# Patient Record
Sex: FEMALE | Race: WHITE
[De-identification: names, ages, dates, MRNs, and addresses within clinical notes are randomized per-mention and may not be internally consistent; named-entity substitution may affect disease eponyms.]

---

## 2017-09-27 ENCOUNTER — HOSPITAL ENCOUNTER (INPATIENT)
Dept: HOSPITAL 83 - ED | Age: 82
LOS: 3 days | Discharge: HOME | DRG: 191 | End: 2017-09-30
Attending: INTERNAL MEDICINE | Admitting: INTERNAL MEDICINE
Payer: MEDICARE

## 2017-09-27 VITALS — SYSTOLIC BLOOD PRESSURE: 134 MMHG | DIASTOLIC BLOOD PRESSURE: 54 MMHG

## 2017-09-27 VITALS — BODY MASS INDEX: 28.16 KG/M2 | WEIGHT: 153.02 LBS | HEIGHT: 62 IN

## 2017-09-27 VITALS — DIASTOLIC BLOOD PRESSURE: 89 MMHG | SYSTOLIC BLOOD PRESSURE: 125 MMHG

## 2017-09-27 VITALS — DIASTOLIC BLOOD PRESSURE: 65 MMHG

## 2017-09-27 DIAGNOSIS — Z79.899: ICD-10-CM

## 2017-09-27 DIAGNOSIS — K22.70: ICD-10-CM

## 2017-09-27 DIAGNOSIS — J20.9: ICD-10-CM

## 2017-09-27 DIAGNOSIS — J80: ICD-10-CM

## 2017-09-27 DIAGNOSIS — Z88.5: ICD-10-CM

## 2017-09-27 DIAGNOSIS — Z79.01: ICD-10-CM

## 2017-09-27 DIAGNOSIS — J45.20: ICD-10-CM

## 2017-09-27 DIAGNOSIS — I10: ICD-10-CM

## 2017-09-27 DIAGNOSIS — Z99.81: ICD-10-CM

## 2017-09-27 DIAGNOSIS — Z88.1: ICD-10-CM

## 2017-09-27 DIAGNOSIS — J44.0: ICD-10-CM

## 2017-09-27 DIAGNOSIS — Z98.51: ICD-10-CM

## 2017-09-27 DIAGNOSIS — Z88.6: ICD-10-CM

## 2017-09-27 DIAGNOSIS — J44.1: Primary | ICD-10-CM

## 2017-09-27 DIAGNOSIS — E03.9: ICD-10-CM

## 2017-09-27 DIAGNOSIS — Z88.8: ICD-10-CM

## 2017-09-27 DIAGNOSIS — I48.0: ICD-10-CM

## 2017-09-27 LAB
ALBUMIN SERPL-MCNC: 3.5 GM/DL (ref 3.1–4.5)
ALP SERPL-CCNC: 87 U/L (ref 45–117)
ALT SERPL W P-5'-P-CCNC: 18 U/L (ref 12–78)
APPEARANCE UR: (no result)
AST SERPL-CCNC: 13 IU/L (ref 3–35)
BACTERIA #/AREA URNS HPF: (no result) /[HPF]
BASOPHILS # BLD AUTO: 0 10*3/UL (ref 0–0.1)
BASOPHILS NFR BLD AUTO: 0.5 % (ref 0–1)
BILIRUB UR QL STRIP: NEGATIVE
BUN SERPL-MCNC: 15 MG/DL (ref 7–24)
CHLORIDE SERPL-SCNC: 108 MMOL/L (ref 98–107)
COLOR UR: YELLOW
CREAT SERPL-MCNC: 0.66 MG/DL (ref 0.55–1.02)
EOSINOPHIL # BLD AUTO: 0.2 10*3/UL (ref 0–0.4)
EOSINOPHIL # BLD AUTO: 2.1 % (ref 1–4)
EPI CELLS #/AREA URNS HPF: (no result) /[HPF]
ERYTHROCYTE [DISTWIDTH] IN BLOOD BY AUTOMATED COUNT: 12.1 % (ref 0–14.5)
GLUCOSE UR QL: NEGATIVE
HCT VFR BLD AUTO: 44.3 % (ref 37–47)
HGB BLD-MCNC: 14.6 G/DL (ref 12–16)
HGB UR QL STRIP: (no result)
KETONES UR QL STRIP: NEGATIVE
LEUKOCYTE ESTERASE UR QL STRIP: NEGATIVE
LYMPHOCYTES # BLD AUTO: 1.4 10*3/UL (ref 1.3–4.4)
LYMPHOCYTES NFR BLD AUTO: 16.3 % (ref 27–41)
MAGNESIUM SERPL-MCNC: 2.1 MG/DL (ref 1.5–2.1)
MCH RBC QN AUTO: 32.3 PG (ref 27–31)
MCHC RBC AUTO-ENTMCNC: 33 G/DL (ref 33–37)
MCV RBC AUTO: 98 FL (ref 81–99)
MONOCYTES # BLD AUTO: 0.7 10*3/UL (ref 0.1–1)
MONOCYTES NFR BLD MANUAL: 7.5 % (ref 3–9)
MUCOUS THREADS URNS QL MICRO: (no result)
NEUT #: 6.5 10*3/UL (ref 2.3–7.9)
NEUT %: 73.4 % (ref 47–73)
NITRITE UR QL STRIP: NEGATIVE
NRBC BLD QL AUTO: 0 % (ref 0–0)
PH UR STRIP: 5.5 [PH] (ref 5–9)
PLATELET # BLD AUTO: 173 10*3/UL (ref 130–400)
PMV BLD AUTO: 11.9 FL (ref 9.6–12.3)
POTASSIUM SERPL-SCNC: 4.5 MMOL/L (ref 3.5–5.1)
PROT SERPL-MCNC: 6.6 GM/DL (ref 6.4–8.2)
RBC # BLD AUTO: 4.52 10*6/UL (ref 4.1–5.1)
RBC #/AREA URNS HPF: (no result) RBC/HPF (ref 0–2)
SODIUM SERPL-SCNC: 142 MMOL/L (ref 136–145)
SP GR UR: 1.02 (ref 1–1.03)
TROPONIN I SERPL-MCNC: < 0.015 NG/ML (ref ?–0.04)
UROBILINOGEN UR STRIP-MCNC: 0.2 E.U./DL (ref 0.2–1)
WBC #/AREA URNS HPF: (no result) WBC/HPF (ref 0–5)
WBC NRBC COR # BLD AUTO: 8.8 10*3/UL (ref 4.8–10.8)

## 2017-09-28 VITALS — SYSTOLIC BLOOD PRESSURE: 144 MMHG | DIASTOLIC BLOOD PRESSURE: 61 MMHG

## 2017-09-28 VITALS — SYSTOLIC BLOOD PRESSURE: 122 MMHG | DIASTOLIC BLOOD PRESSURE: 64 MMHG

## 2017-09-28 VITALS — DIASTOLIC BLOOD PRESSURE: 46 MMHG | SYSTOLIC BLOOD PRESSURE: 129 MMHG

## 2017-09-28 VITALS — SYSTOLIC BLOOD PRESSURE: 120 MMHG | DIASTOLIC BLOOD PRESSURE: 56 MMHG

## 2017-09-28 VITALS — DIASTOLIC BLOOD PRESSURE: 56 MMHG | SYSTOLIC BLOOD PRESSURE: 121 MMHG

## 2017-09-28 LAB
T4 FREE SERPL-MCNC: 1.34 NG/DL (ref 0.76–1.46)
TSH SERPL DL<=0.005 MIU/L-ACNC: 0.09 UIU/ML (ref 0.36–4.75)

## 2017-09-29 VITALS — SYSTOLIC BLOOD PRESSURE: 138 MMHG | DIASTOLIC BLOOD PRESSURE: 64 MMHG

## 2017-09-29 VITALS — DIASTOLIC BLOOD PRESSURE: 46 MMHG

## 2017-09-29 VITALS — DIASTOLIC BLOOD PRESSURE: 72 MMHG | SYSTOLIC BLOOD PRESSURE: 128 MMHG

## 2017-09-29 VITALS — DIASTOLIC BLOOD PRESSURE: 56 MMHG

## 2017-09-29 VITALS — DIASTOLIC BLOOD PRESSURE: 70 MMHG | SYSTOLIC BLOOD PRESSURE: 120 MMHG

## 2017-09-29 LAB
BUN SERPL-MCNC: 15 MG/DL (ref 7–24)
CHLORIDE SERPL-SCNC: 101 MMOL/L (ref 98–107)
CREAT SERPL-MCNC: 0.66 MG/DL (ref 0.55–1.02)
MAGNESIUM SERPL-MCNC: 2.2 MG/DL (ref 1.5–2.1)
POTASSIUM SERPL-SCNC: 4.7 MMOL/L (ref 3.5–5.1)
SODIUM SERPL-SCNC: 137 MMOL/L (ref 136–145)

## 2017-09-30 VITALS — SYSTOLIC BLOOD PRESSURE: 145 MMHG | DIASTOLIC BLOOD PRESSURE: 68 MMHG

## 2017-09-30 VITALS — DIASTOLIC BLOOD PRESSURE: 70 MMHG

## 2017-09-30 VITALS — DIASTOLIC BLOOD PRESSURE: 54 MMHG

## 2019-06-04 ENCOUNTER — HOSPITAL ENCOUNTER (INPATIENT)
Dept: HOSPITAL 83 - 5E | Age: 84
LOS: 6 days | Discharge: OTHER INSTITUTION | DRG: 190 | End: 2019-06-10
Attending: INTERNAL MEDICINE | Admitting: INTERNAL MEDICINE
Payer: MEDICARE

## 2019-06-04 VITALS — WEIGHT: 151.31 LBS | BODY MASS INDEX: 27.84 KG/M2 | HEIGHT: 62 IN

## 2019-06-04 VITALS — DIASTOLIC BLOOD PRESSURE: 80 MMHG | SYSTOLIC BLOOD PRESSURE: 138 MMHG

## 2019-06-04 VITALS — DIASTOLIC BLOOD PRESSURE: 46 MMHG | SYSTOLIC BLOOD PRESSURE: 109 MMHG

## 2019-06-04 VITALS — DIASTOLIC BLOOD PRESSURE: 76 MMHG

## 2019-06-04 VITALS — DIASTOLIC BLOOD PRESSURE: 78 MMHG

## 2019-06-04 DIAGNOSIS — K22.70: ICD-10-CM

## 2019-06-04 DIAGNOSIS — G93.41: ICD-10-CM

## 2019-06-04 DIAGNOSIS — J45.31: ICD-10-CM

## 2019-06-04 DIAGNOSIS — G47.33: ICD-10-CM

## 2019-06-04 DIAGNOSIS — T38.0X5A: ICD-10-CM

## 2019-06-04 DIAGNOSIS — Z90.49: ICD-10-CM

## 2019-06-04 DIAGNOSIS — R73.9: ICD-10-CM

## 2019-06-04 DIAGNOSIS — J44.0: Primary | ICD-10-CM

## 2019-06-04 DIAGNOSIS — J20.9: ICD-10-CM

## 2019-06-04 DIAGNOSIS — Z98.891: ICD-10-CM
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
## 2019-06-04 DIAGNOSIS — Z79.01: ICD-10-CM

## 2019-06-04 DIAGNOSIS — Z88.1: ICD-10-CM

## 2019-06-04 DIAGNOSIS — E66.9: ICD-10-CM

## 2019-06-04 DIAGNOSIS — I10: ICD-10-CM

## 2019-06-04 DIAGNOSIS — I48.2: ICD-10-CM

## 2019-06-04 DIAGNOSIS — Z88.8: ICD-10-CM

## 2019-06-04 DIAGNOSIS — R62.7: ICD-10-CM

## 2019-06-04 DIAGNOSIS — E78.2: ICD-10-CM

## 2019-06-04 DIAGNOSIS — Z88.5: ICD-10-CM

## 2019-06-04 DIAGNOSIS — E03.9: ICD-10-CM

## 2019-06-04 DIAGNOSIS — Z98.51: ICD-10-CM

## 2019-06-04 DIAGNOSIS — J44.1: ICD-10-CM

## 2019-06-04 DIAGNOSIS — M79.89: ICD-10-CM

## 2019-06-04 LAB
ALBUMIN SERPL-MCNC: 3 GM/DL (ref 3.1–4.5)
ALP SERPL-CCNC: 79 U/L (ref 45–117)
ALT SERPL W P-5'-P-CCNC: 25 U/L (ref 12–78)
AST SERPL-CCNC: 13 IU/L (ref 3–35)
BASOPHILS # BLD AUTO: 0 10*3/UL (ref 0–0.1)
BASOPHILS NFR BLD AUTO: 0.4 % (ref 0–1)
BUN SERPL-MCNC: 16 MG/DL (ref 7–24)
CHLORIDE SERPL-SCNC: 103 MMOL/L (ref 98–107)
CREAT SERPL-MCNC: 0.71 MG/DL (ref 0.55–1.02)
EOSINOPHIL # BLD AUTO: 0.2 10*3/UL (ref 0–0.4)
EOSINOPHIL # BLD AUTO: 1.7 % (ref 1–4)
ERYTHROCYTE [DISTWIDTH] IN BLOOD BY AUTOMATED COUNT: 12.4 % (ref 0–14.5)
HCT VFR BLD AUTO: 44.7 % (ref 37–47)
HGB BLD-MCNC: 14.6 G/DL (ref 12–16)
LYMPHOCYTES # BLD AUTO: 0.8 10*3/UL (ref 1.3–4.4)
LYMPHOCYTES NFR BLD AUTO: 8.6 % (ref 27–41)
MCH RBC QN AUTO: 33 PG (ref 27–31)
MCHC RBC AUTO-ENTMCNC: 32.7 G/DL (ref 33–37)
MCV RBC AUTO: 101.1 FL (ref 81–99)
MONOCYTES # BLD AUTO: 0.7 10*3/UL (ref 0.1–1)
MONOCYTES NFR BLD MANUAL: 7.8 % (ref 3–9)
NEUT #: 7.6 10*3/UL (ref 2.3–7.9)
NEUT %: 81 % (ref 47–73)
NRBC BLD QL AUTO: 0 % (ref 0–0)
PLATELET # BLD AUTO: 163 10*3/UL (ref 130–400)
PMV BLD AUTO: 11 FL (ref 9.6–12.3)
POTASSIUM SERPL-SCNC: 4.1 MMOL/L (ref 3.5–5.1)
PROT SERPL-MCNC: 6.5 GM/DL (ref 6.4–8.2)
RBC # BLD AUTO: 4.42 10*6/UL (ref 4.1–5.1)
SODIUM SERPL-SCNC: 142 MMOL/L (ref 136–145)
TROPONIN I SERPL-MCNC: < 0.015 NG/ML (ref ?–0.04)
WBC NRBC COR # BLD AUTO: 9.4 10*3/UL (ref 4.8–10.8)

## 2019-06-04 NOTE — PR
[place], [place]
 
                                      PROGRESS NOTE
 
        NAME: GERHARD BROUSSARD                  ACCT #: [id number]  
        UNIT #: [id number]                       ROOM: 531       
        DOCTOR: SHIRA BREAUX MD               BIRTHDATE: [birth year]
 
 
DOS: 06/09/2019
 
SUBJECTIVE:  The patient is about the same, does not have any new complaints.
 
OBJECTIVE:
VITAL SIGNS:  Graphic trend shows a pressure 110/62, pulse of 92, respirations
18, temperature 97.8.
LUNGS:  Diminished breath sounds.  Clear this morning.
HEART:  Irregular.  Heart rate controlled.
ABDOMEN:  Obese.
EXTREMITIES:  About 1+ pitting edema.
 
ASSESSMENT AND PLAN:
1.  Acute exacerbation of chronic obstructive pulmonary disease, on maximal
treatment plan.  Bronchospasm was resolving.  We will taper the steroids down.
2.  Chronic atrial fibrillation with rapid ventricular response.  Heart rate has
slowed down.
3.  Adult failure to thrive, awaiting placement to [facility]
tomorrow.
 
 
 
_________________________________
SHIRA BREAUX MD
 
CM:PNTRANS
 
D: 06/09/19 0804                 
T: 06/09/19 1116
             
                                                            
SHIRA BREAUX MD               
                                                            
06/09/19
1117
                              
interface

## 2019-06-04 NOTE — PR
[place], [place]
 
                                      PROGRESS NOTE
 
        NAME: GERHARD BROUSSARD                  ACCT #: [id number]  
        UNIT #: [id number]                       ROOM: 531       
        DOCTOR: SHIRA BREAUX MD               BIRTHDATE: [birth year]
 
 
DOS: 
 
SUBJECTIVE:  The patient is about the same, does not have any new complaints. 
She feels better.
 
OBJECTIVE:
VITAL SIGNS:  Graphic trend shows blood pressure of 151/61, pulse of 110,
respirations 27, temperature 98.1
LUNGS:  Diminished breath sounds, scattered wheezes, but improved since
admission.
HEART:  Irregular.
ABDOMEN:  Obese.
EXTREMITIES:  Without any edema.
 
ASSESSMENT AND PLAN:
1.  Acute exacerbation of chronic obstructive pulmonary disease, on maximal
treatment plan.
2.  Acute tracheobronchitis, on antibiotics.
3.  Adult failure to thrive.  She has agreed to go to rehab.  PT has been
consulted.  Case management looking at local rehab centers for the patient to
go.
 
 
 
_________________________________
SHIRA BREAUX MD
 
CM:PNTRANS
 
D: 06/06/19 0841                 
T: 06/06/19 1806
             
                                                            
SHIRA BREAUX MD               
                                                            
06/07/19
0247
                              
interface

## 2019-06-04 NOTE — PR
[place], [place]
 
                                      PROGRESS NOTE
 
        NAME: GERHARD BROUSSARD                  ACCT #: [id number]  
        UNIT #: [id number]                       ROOM: 531       
        DOCTOR: ZEINAB MELO MD           BIRTHDATE: [birth year]
 
 
DOS: 06/10/2019
 
PULMONARY PROGRESS NOTE
 
SUBJECTIVE:  The patient noted comfortable at this time, resting on the bed this
morning, continues to show improvement and resolution of acute symptoms this
morning.  Denies symptoms of fever or chills.  Coughing has been subsiding, but
not completely resolved.
 
OBJECTIVE:
VITAL SIGNS:  Normal temperature, respiratory rate 20, heart rate 101, blood
pressure 100/30.  The pulse oxygen saturation on 2 liters nasal cannula 92%
saturation recorded.
HEENT:  Examination shows head was atraumatic.  Eyes nonicterus.
NECK:  Supple.
CARDIOVASCULAR:  S1, S2 is audible.
LUNGS:  The patient was noted without any wheezing or crackles at the present
time.  Breaths are noted mild-to-moderate decreased bilaterally with occasional
wheezing, no crackles.
ABDOMEN:  Soft, nontender.  Bowel sounds present.
EXTREMITIES:  No acute change.
 
LABORATORY DATA:  CBC:  Normal WBC count and platelet count.  Blood sugar was
487.  BUN 26, creatinine 1.10.
 
IMPRESSION:
1.  Resolving acute exacerbation of chronic obstructive pulmonary disease, acute
bronchitis, gradually and progressive on current medical management.
2.  Uncontrolled hyperglycemia as well.
 
PLAN OF TREATMENT:  Change the Solu-Medrol 20 mg daily this morning and to be
discontinued after 3 days.  Continue antibiotics.  Discharge planning was
discussed with Dr. Chel Mireles.
 
 
 
 
                              [place], [place]
 
                                      PROGRESS NOTE
 
        NAME: GERHARD BROUSSARD                  ACCT #: [id number]  
        UNIT #: [id number]                       ROOM: 531       
        DOCTOR: ZEINAB MELO MD           BIRTHDATE: [birth year]
 
 
_________________________________
ZEINAB SWARTZ MD
 
CM:PNTRANS
 
D: 06/10/19 1006                 
T: 06/10/19 1349
             
                                                            
ZEINAB STEIN MD           
                                                            
06/10/19
1350
                              
interface

## 2019-06-04 NOTE — EKG
[place], [place]
 
                               ELECTROCARDIOGRAM REPORT
 
        NAME: GERHARD BROUSSARD                   ACCT #: [id number]  
        UNIT #: [id number]                        ROOM: 531       
        DOCTOR: YOUSUF DRAFT REPORT          BIRTHDATE: [birth year]
 
 
 

 
 
                           [facility]
                                       
Test Date:    2019               Test Time:    14:05:38
Pat Name:     GERHARD BROUSSARD             Department:   
Patient ID:   [id number]             Room:         531 1
Gender:       F                        Technician:   Mary Moreno
:          1931               Requested By: ZEINAB STEIN
Order Number: [id number]      Reading MD:   Zeinab Sheldon MD
                                 Measurements
Intervals                              Axis          
Rate:         105                      P:            
WA:                                    QRS:          43
QRSD:         81                       T:            79
QT:           325                                    
QTc:          430                                    
                           Interpretive Statements
Atrial fibrillation
Borderline T wave abnormalities
No previous ECG available for comparison
 
Electronically Signed On 2019 11:57:10 PDT by Zeinab Sheldon MD
 
CM:EKGRPT:ELECTROCARDIOGRAM REPORT
 
D: 19 1405
T: 19 1157
    
ZEINAB STEIN MD                                         
EPIPHANY DRAFT REPORT         
ZEINAB STEIN MD

## 2019-06-04 NOTE — PR
[place], [place]
 
                                      PROGRESS NOTE
 
        NAME: GERHARD BROUSSARD                  [id number]  
        UNIT #: [id number]                       ROOM: 531       
        DOCTOR: SHIRA BREAUX MD               BIRTHDATE: [birth year]
 
 
DOS: 06/10/2019
 
SUBJECTIVE:  The patient is sitting up in bed, doing well, does not have any
complaints today.
 
OBJECTIVE:
VITAL SIGNS:  Blood pressure is 100/30, pulse of 20, respirations 18,
temperature 97.5.
LUNGS:  Diminished breath sounds.  Clear.
HEART:  Irregular.
ABDOMEN:  Obese.
EXTREMITIES:  Without any edema.
 
LABORATORY DATA:  This morning showed a blood sugar was 487, BUN 26, creatinine
1.10, sodium 135, potassium 4.4, chloride 97, this is new onset diabetes.  WBC
count is 8.9, hemoglobin 11.2.
 
ASSESSMENT AND PLAN:
1.  Acute exacerbation of chronic obstructive pulmonary disease, finally
improved and stable.  Plan is to discharge.  Discussed with Dr. Sheldon.  Chest
x-ray showed COPD, no pneumonia was seen.  White cell count is normal.
3.  Hyperglycemia, steroid-induced.  The patient has been placed on coverage
scale and Amaryl.
 
 
 
_________________________________
SHIRA BREAUX MD
 
CM:PNTRANS
 
D: 06/10/19 0845                 
T: 06/10/19 1305
             
                                                            
SHIRA BREAUX MD               
                                                            
06/10/19
1306
                              
interface

## 2019-06-04 NOTE — PR
[place], [place]
 
                                      PROGRESS NOTE
 
        NAME: GERHARD BROUSSARD                  ACCT #: [id number]  
        UNIT #: [id number]                       ROOM: 531       
        DOCTOR: SHERIDAN STEIN MD,ZEINAB           BIRTHDATE: [birth year]
 
 
DOS: 06/08/2019
 
PULMONARY PROGRESS NOTE
 
SUBJECTIVE:  The patient was noted comfortable at this time, resting, sitting on
the bed this morning of assessment.  The coughing has been noted decreased from
yesterday examination.  Shortness of breath and wheezing was also improving. 
There were no symptoms of chest pain, fever, or chills.
 
OBJECTIVE:
VITAL SIGNS:  For the patient this morning as, sitting on the bed comfortably,
was normal temperature, respiratory rate 18, heart rate 109, blood pressure
90/54, pulse ox saturation on 3 liters nasal cannula 97% saturation.
HEENT:  Examination shows head was atraumatic.  Eyes nonicterus.
NECK:  Supple.
CARDIOVASCULAR:  S1, S2 audible.
LUNGS:  The patient noted decreased breath sounds bilaterally with expiratory
wheezing, which were noted mild-to-moderate this morning.
ABDOMEN:  Soft, nontender.  Bowel sounds present.
EXTREMITIES:  The patient was noted without any edema, clubbing, or cyanosis.
 
LABORATORY DATA:  PT and PTT were noted as normal yesterday.
 
IMPRESSION:  The patient with resolving acute tracheobronchitis with acute
exacerbation of chronic obstructive pulmonary disease.
 
PLAN OF MANAGEMENT:  The patient was not require bronchoscopy at this time
because of improvement noted respiratory status.  We will managed the patient
conservative at this time.  Possible consideration of the patient home discharge
in the morning could be considered based on the further improvement in the
respiratory status and symptoms.  Solu-Medrol dose will be decreased to 40 mg
b.i.d. today.
 
 
 
_________________________________
ZEINAB SWARTZ MD
 
CM:PNTRANS
 
D: 06/08/19 1425                 
T: 06/08/19 1904
             
                                                            
ZEINAB STEIN MD           
                                                            
06/08/19
1905
                              
interface

## 2019-06-04 NOTE — CON
[place], [place]
 
                                 REPORT OF CONSULTATION
 
        NAME: GERHARD BROUSSARD                    [id number]  
        UNIT #: [id number]                         ROOM: 531       
        DOCTOR: ZEINAB MELO MD             BIRTHDATE: [birth year]
 
 
DOS: 06/07/2019
 
PULMONARY CONSULTATION, EVALUATION AND MANAGEMENT
 
REASON FOR CONSULTATION:  To assess the patient for bronchoscopy.
 
The patient was independently seen and examined in face-to-face encounter.  The
assessment was completed after obtaining the history and physical examination,
review of the labs.  The management change personally recommended as well.  Note
done by the medical resident was approved.
 
HISTORY OF PRESENT ILLNESS:  This is an 87-year-old white female patient who has
been admitted to the hospital in 05/2019.  The patient stayed in the hospital
for about 5 days, treated for acute exacerbation of COPD, acute bronchitis
related to Pseudomonas aeruginosa.  The patient discharged home as she wants to
make a trip for the patient to [facility].  The patient visited her
granddaughter.  The patient returned back to [place].  She has been admitted to the
hospital as the patient is reporting increased symptoms of chest congestion with
coughing and shortness of breath.  The symptoms has been noted gradually
increased.  She denies symptoms of wheezing.  No symptoms of chest pain
reported.  No symptoms of hemoptysis stated by the patient.
 
REVIEW OF SYSTEMS:
CONSTITUTIONAL SYMPTOMS:  Fatigue and tiredness noted without any symptoms of
fever or chills.
EYES:  Denies burning, redness, or tenderness.
EARS, NOSE, THROAT SYMPTOMS:  Denies sore throat, hoarseness, otalgia, or
postnasal drainage.
CARDIOVASCULAR SYSTEM:  Denies anginal pain, edema, or pain of the lower
extremity, or palpitation.
GASTROINTESTINAL SYMPTOMS:  Denies dysphagia, nausea, vomiting, diarrhea,
abdominal pain, hematemesis, melena, or hematochezia.
GENITOURINARY SYMPTOMS:  Denies dysuria, suprapubic pain, or hematuria.
MUSCULOSKELETAL SYSTEM:  No acute joint pain, redness, or tenderness.
CENTRAL NERVOUS SYSTEM:  General weakness, fatigue without any symptoms of any
focal neurologic deficit stated.
Remaining systems were reviewed.  They were noted all negative.
 
PAST MEDICAL HISTORY:  Noted with:
1.  History of permanent atrial fibrillation.
2.  COPD.
3.  Uncomplicated mild persistent bronchial asthma.
4.  Hypothyroidism.
5.  Obstructive sleep apnea disorder.
6.  Joyner's esophagus.
7.  Partial hearing loss.
8.  Mixed hyperlipidemia.
 
SOCIAL HISTORY:  The patient is currently .  She lives at home.  The
patient does not have any past history of tobacco use or any illicit drug use.
 
                              [place], [place]
 
                                 REPORT OF CONSULTATION
 
        NAME: GERHARD BROUSSARD                    ACCT #: [id number]  
        UNIT #: [id number]                         ROOM: 531       
        DOCTOR: ZEINAB MELO MD             BIRTHDATE: [birth year]
 
 
 
PAST SURGICAL HISTORY:  Noted:
1.  Radiofrequency ablation of the atrial tract.
2.  T and A as a child.
3.  Appendectomy.
4.  Three C-sections.
5.  D and C twice.
6.  Tubal ligation.
7.  Removal of fatty tumor from the shoulder.
8.  History of surgery for the lymphoma.
 
FAMILY HISTORY:  Unknown.
 
CURRENT MEDICATIONS:  Administered this hospitalization were noted as use of
simvastatin, potassium chloride, losartan, Lasix oral, citalopram, Flonase,
diltiazem, levothyroxine, Accolate, Protonix, Eliquis, Mucinex 600 mg p.o.
b.i.d., levalbuterol and azithromycin.
 
DRUG ALLERGIES:  NOTED WITH ALLERGY TO THE DARVOCET, CODEINE, TETRACYCLINE,
PROMETHAZINE, AND DEMEROL.
 
PHYSICAL EXAMINATION:
GENERAL:  The patient is an 87-year-old female patient who has been noted
currently awake and alert this morning of assessment without any acute major
distress, has been noted with coughing at this time nonproductive without any
sputum expectoration.
VITAL SIGNS:  For the patient, which are recorded as normal temperature.  The
respiratory rate ranged between 20-18, heart rate of 102-99, blood pressure
133/77-90/46.
HEENT:  Head was atraumatic.  Eyes nonicterus.
NECK:  Supple.
CARDIOVASCULAR SYSTEM:  S1, S2 audible.
LUNGS:  Noted mild-to-moderate expiratory wheezing without any crackles.
ABDOMEN:  Soft, mild obesity.  Bowel sounds present without any tenderness.
MUSCULOSKELETAL:  Without any acute deformities.
CENTRAL NERVOUS SYSTEM:  Appears to be intact.
 
LABORATORY DATA:  CMP that was done on 06/04/2019 as a normal BUN and
creatinine.  CBC on 06/04/2019 as normal hemoglobin, hematocrit and platelet
count.  Troponin of 3 sets on the 4th noted as normal results.  Chest x-ray,
2-view, which were taken on 06/04/2019 noted change of COPD without any acute
pulmonary infiltration.
 
IMPRESSION:
1.  The patient who has been currently admitted to the hospital was noted with
finding of recurrent acute exacerbation of chronic obstructive pulmonary
disease/bronchial asthma with significant nonproductive cough and possibility of
mucus impaction of major airways.
2.  The patient with chronic anticoagulation was noted with the use of Eliquis.
3.  Moderate obesity history.
 
                              [place], [place]
 
                                 REPORT OF CONSULTATION
 
        NAME: GERHARD BROUSSARD                    ACCT #: [id number]  
        UNIT #: [id number]                         ROOM: 531       
        DOCTOR: SHERIDAN STEIN MD,ZEINAB             BIRTHDATE: [birth year]
 
 
4.  History of atrial tachycardia.
5.  Hypothyroidism.
6.  Essential hypertension as well.
 
PLAN OF MANAGEMENT:  Continuation of the patient's bronchodilators with oxygen
supplementation.  She will be started on Solu-Medrol as well.  Antibiotic at
this time will be given based on the previous history of Pseudomonas aeruginosa
with possible recurrent Pseudomonas aeruginosa, tracheobronchitis cannot be
excluded.  Other medical management changes for the patient will be made based
on progression of the illness.  Usual care.  Supportive therapy, plan of
management, care plan.  Hold of the Eliquis on the evening of Sunday for
bronchoscopy planned to be done on Monday morning to decrease any risk of
bleeding.  Other supportive therapy, plan and management as well.  Usual care.
 
Thanks for allowing me to participate in the care of this patient.
 
 
 
_________________________________
ZEINAB SWARTZ MD
 
CM:CONSTR:REPORT OF CONSULTATION
 
D: 06/07/19 1305   T: 06/08/19 06/08/19     0208                                          interface

## 2019-06-04 NOTE — PR
[place], [place]
 
                                      PROGRESS NOTE
 
        NAME: GERHARD BROUSSARD                  ACCT #: [id number]  
        UNIT #: [id number]                       ROOM: 531       
        DOCTOR: SHIRA BREAUX MD               BIRTHDATE: [birth year]
 
 
DOS: 
 
SUBJECTIVE:  The patient continues to have a cough and just unable to cough up
much mucus.  She feels better overall.  Denies having any complaints today.
 
OBJECTIVE:
GENERAL:  She is awake and alert and oriented.
VITAL SIGNS:  Blood pressure is 132/70, pulse of 76 and irregular, respiratory
rate 20.
LUNGS:  Diminished breath sounds, scattered wheezes and rhonchi today.
HEART:  Regular.
ABDOMEN:  Obese, soft, nontender.
EXTREMITIES:  Without any edema.
 
ASSESSMENT AND PLAN:
1.  Acute exacerbation of chronic obstructive pulmonary disease, already on
maximal treatment plan.  We will consult Dr. Sheldon and possible bronchoscopy if
we could get a better culture.
2.  Recent hospitalization for acute exacerbation with pseudomonas in the sputum
was completely treated.
3.  Adult failure to thrive, awaiting placement to [place].
4.  Chronic atrial fibrillation, already on appropriate treatment regimen.
 
 
 
_________________________________
SHIRA BREAUX MD
 
CM:PNTRANS
 
D: 06/07/19 0718                 
T: 06/07/19 1959
             
                                                            
SHIRA BREAUX MD               
                                                            
06/08/19
0409
                              
interface

## 2019-06-04 NOTE — PR
[place], [place]
 
                                      PROGRESS NOTE
 
        NAME: GERHARD BROUSSARD                  [id number]  
        UNIT #: [id number]                       ROOM: 531       
        DOCTOR: SHIRA BREAUX MD               BIRTHDATE: [birth year]
 
 
DOS: 06/08/2019
 
SUBJECTIVE:  The patient is about the same, does not have any new complaints. 
She is starting to feel better, but has noticed some swelling in her legs.
 
OBJECTIVE:
VITAL SIGNS:  Pressure is 99/45, pulse of 109, respirations 18, temperature
97.4.
LUNGS:  Diminished breath sounds, clear.  This morning, her cough is also much
better.
HEART:  Irregular.
ABDOMEN:  Obese, soft.
EXTREMITIES:  Without any edema.
 
ASSESSMENT AND PLAN:
1.  Acute exacerbation of chronic obstructive pulmonary disease, improving on
current treatment plan.  Bronchoscopy was not done because there was no
scheduled timing on Friday.  Dr. Sheldon plans to do it on Monday, but the patient
looks like she is getting better on her own.
2.  Chronic atrial fibrillation, controlled.
3.  Swelling of lower legs could be related to high dose of steroids.  We will
give her Lasix and compression stockings.
4.  Adult failure to thrive.  Plan to discharge her to [place] when stable.
 
 
 
_________________________________
SHIRA BREAUX MD
 
CM:PNTRANS
 
D: 06/08/19 0857                 
T: 06/09/19 0009
             
                                                            
SHIRA BREAUX MD               
                                                            
06/09/19
0010
                              
interface

## 2019-06-04 NOTE — WRIGHTHP
[place], [place]
 
                               PATIENT HISTORY AND PHYSICAL EXAM
 
        NAME: GERHARD BROUSSARD                   [id number]  
        UNIT #: [id number]                        ROOM: 531       
        DOCTOR: SHIRA BREAUX MD                BIRTHDATE: [birth year]
 
 
DOS: 06/04/2019
 
HISTORY OF PRESENT ILLNESS:  The patient is very well known to us.  The patient
comes in to the office with complaints of increasing shortness of breath.  She
was just admitted to the hospital and released recently.  She had to go for a
graduation trip to [facility].  By the time she got back, she was
increasingly short of breath as per family members and so was brought to the
office.  She denies having any chest pains or palpitations.  She is quite tired
and slightly on the confused side as per the family.
 
PAST MEDICAL HISTORY:  Significant for:
1.  Recent hospitalization for COPD exacerbation.
2.  Chronic atrial fibrillation.
3.  Benign hypertension.
4.  Moderate asthma, intermittent.
5.  Hypothyroidism.
6.  Sleep apnea.
7.  Joyner's esophagus.
8.  Hearing loss.
9.  Mixed hyperlipidemia, recent pseudomonas in the sputum.
 
MEDICATIONS:  Medications that she is on are Flonase p.r.n., Tylenol, Eliquis 5
b.i.d., Lexapro 10 daily, Lasix 40 b.i.d., KCl 20 daily, levothyroxine 75 mcg
daily, losartan 100 daily, Protonix 40 daily, pravastatin 80 daily, zafirlukast
20 b.i.d.
 
SOCIAL HISTORY:  Nonsmoker, does not use any alcohol.
 
PHYSICAL EXAMINATION:
GENERAL:  She is awake and alert and oriented, in moderate respiratory distress.
VITAL SIGNS:  Pressure is 115/57, pulse of 109, respirations 18, temperature
98.7.  On 2 liters, oxygen saturation was 92.
LUNGS:  Diminished breath sounds.  Scattered wheezes and rhonchi.
HEART:  Regular.
ABDOMEN:  Obese, soft.
EXTREMITIES:  Without any edema.
 
ASSESSMENT AND PLAN:
1.  Acute exacerbation of chronic obstructive pulmonary disease.  The patient is
admitted for IV steroids.
2.  Adult failure to thrive, may require short-term placement for
rehabilitation.  PT/OT consultation will be obtained.
3.  Chronic atrial fibrillation.  Heart rate is slightly high this morning.  The
patient is placed on IV Cardizem drip.  She is otherwise on maximal treatment
plan.
 
 
 
 
                              [place], [place]
 
                               PATIENT HISTORY AND PHYSICAL EXAM
 
        NAME: GERHARD BROUSSARD                   ACCT #: [id number]  
        UNIT #: [id number]                        ROOM: 531       
        DOCTOR: SHIRA BREAUX MD                BIRTHDATE: [birth year]
 
 
_________________________________
SHIRA BREAUX MD
 
CM:HISPHYS:PATIENT HISTORY AND PHYSICAL EXAMINATION
 
D: 06/05/19 0911
T: 06/05/19 0955
    
                                                            
SHIRA BREAUX MD               
                                                            
06/05/19
0957
                              
interface

## 2019-06-04 NOTE — DS
[place], [place]
 
                                    DISCHARGE SUMMARY
 
        NAME: GERHARD BROUSSARD                   ACCT #: [id number]  
        UNIT #: [id number]                        ROOM: 531       
        DOCTOR: SHIRA BREAUX MD                BIRTHDATE: [birth year]
 
 
DOS: 06/10/2019
 
DIAGNOSES:
1.  Acute exacerbation of chronic obstructive pulmonary disease.
2.  Metabolic encephalopathy, multifactorial.
3.  Chronic atrial fibrillation.
4.  Benign hypertension.
5.  History of moderate intermittent asthma.
6.  Hypothyroidism.
7.  Sleep apnea.
8.  Joyner's esophagus.
9.  Mixed hyperlipidemia.
10.  Recent hospitalization with the Pseudomonas in the sputum.
11.  New onset steroid-induced hyperglycemia.
 
MEDICATIONS ON DISCHARGE:  Will be zafirlukast 10 mg daily, KCl 20 daily,
Protonix 40 daily, Lexapro 10 daily, losartan 100 daily, Flonase nasal spray 2
sprays each nostril daily, MiraLax 17 grams daily, Eliquis 5 b.i.d., Pravachol
80 daily, Tylenol 650 q. 6 p.r.n., levothyroxine 75 mcg daily, Lasix 40 b.i.d.,
Amaryl 2 mg daily sliding scale.  Blood sugars twice a day with coverage, ADA
1800 calories, DuoNebs q. 6, diltiazem 240 mg daily, Ceftin 250 mg twice daily
for 5 days and prednisone 10 mg daily for 5 days, 5 mg daily for 5 days, and
then discontinue.
 
HOSPITAL COURSE:  This patient is 87 years old, very well known to us, comes in
with complaints of difficulty breathing.  Please refer to H and P for details. 
She also was confused at the house.  She was admitted, was placed on IV fluids,
IV antibiotics, breathing treatments, oxygen supplementation.  The patient
improved initially, but continued to have cough and shortness of breath, so Dr. Sheldon was consulted with possible bronchoscopy but the patient continues to
improve and did not require it.  Routine labs at the time of admission did show
a blood sugar of 128, but then the blood sugar on a repeat lab later on done
around 487.  This is most likely steroid-induced and the blood sugar should come
down once the steroids are tapered off.   Until then, she will be on Amaryl and
coverage scale.
 
The patient did have PT, OT consultation and Social Service was consulted for
placement short-term for rehabilitation because the patient has had multiple
admissions recently.  The patient has been accepted at [facility],
and so the plan is to discharge her today.
 
 
 
 
                              [place], [place]
 
                                    DISCHARGE SUMMARY
 
        NAME: GERHARD BROUSSARD                   ACCT #: [id number]  
        UNIT #: [id number]                        ROOM: 531       
        DOCTOR: SHIRA BREAUX MD                BIRTHDATE: [birth year]
 
 
_________________________________
SHIRA BREAUX MD
 
CM:DISCHARG
 
D: 06/10/19 0848
T: 06/10/19 1122
    
                                                            
SHIRA BREAUX MD               
                                                            
06/10/19
1123
                              
interface

## 2019-06-04 NOTE — PR
[place], [place]
 
                                      PROGRESS NOTE
 
        NAME: GERHARD BROUSSARD                  ACCT #: [id number]  
        UNIT #: [id number]                       ROOM: 531       
        DOCTOR: SHERIDAN STEIN MD,ZEINAB           BIRTHDATE: [birth year]
 
 
DOS: 06/09/2019
 
PULMONARY PROGRESS NOTE
 
SUBJECTIVE:  The patient was seen and examined on 06/09/2019.  Continued to do
well.  Reduction of respiratory symptoms noted.  Denies symptoms of fever,
chills, or cough.  The coughing has been subsiding.  There were no symptoms of
hemoptysis.
 
OBJECTIVE:
VITAL SIGNS:  For the patient normal temperature, respiratory rate 18, heart
rate 96, blood pressure 112/60, pulse oxygen saturation recorded as 94%
saturation on 2 liters nasal cannula.
HEENT:  Examination shows head was atraumatic.  Eyes nonicterus.
NECK:  Supple.
CARDIOVASCULAR:  S1, S2 audible.
LUNGS:  Without any crackle, rhonchi, or wheezing.
ABDOMEN:  Soft, nontender.
EXTREMITIES:  No edema.
 
IMPRESSION:  Resolving acute exacerbation of chronic obstructive pulmonary
disease, gradual and progressive reduction of the symptoms of cough as well.
 
PLAN OF MANAGEMENT:  No change in plan of care.  Continue current therapy as in
progress with additional treatment change per patient's progression of illness.
 
 
 
_________________________________
ZEINAB SWARTZ MD
 
CM:PNTRANS
 
D: 06/09/19 1321                 
T: 06/10/19 0025
             
                                                            
ZEINAB STEIN MD           
                                                            
06/10/19
0026
                              
interface

## 2019-06-04 NOTE — NUR
A 87, DIRECTLY
admitted to , under the
services of SHIRA Johns MD with a diagnosis of CHF.
Chief complaint is DYSPNEA, BILATERAL LE EDEMA.
Patient arrived via ambulatory from [place].
Monitor applied. Initial assessment completed.
Vital signs taken and recorded.
SHIRA JOHNS MD notified of admission to the unit.
Orders received.
See assessment for past medical history, medications
and allergies.
Patient and/or family oriented to unit.TELEMETRY
visitation policy reviewed.
Clothing/patient valuable form completed.
 
ROSALBA GALARZA

## 2019-06-05 VITALS — SYSTOLIC BLOOD PRESSURE: 142 MMHG | DIASTOLIC BLOOD PRESSURE: 80 MMHG

## 2019-06-05 VITALS — DIASTOLIC BLOOD PRESSURE: 57 MMHG

## 2019-06-05 VITALS — DIASTOLIC BLOOD PRESSURE: 64 MMHG

## 2019-06-05 VITALS — DIASTOLIC BLOOD PRESSURE: 48 MMHG

## 2019-06-05 VITALS — SYSTOLIC BLOOD PRESSURE: 126 MMHG | DIASTOLIC BLOOD PRESSURE: 86 MMHG

## 2019-06-05 VITALS — SYSTOLIC BLOOD PRESSURE: 129 MMHG | DIASTOLIC BLOOD PRESSURE: 68 MMHG

## 2019-06-05 VITALS — DIASTOLIC BLOOD PRESSURE: 56 MMHG

## 2019-06-05 VITALS — DIASTOLIC BLOOD PRESSURE: 70 MMHG | SYSTOLIC BLOOD PRESSURE: 129 MMHG

## 2019-06-05 NOTE — NUR
in to talk to patient.
Patient states lives at home with her daughter.
There are 18 steps in the home.
Physician: Dr. Chel Mireles
Pharmacy: mail order or [facility]
Home health services: none
Patient's level of ADLs: INDEPENDENT
Patient has working utilities: yes
DME: O2 @ 3L nc, portable O2 tanks, nebulizer, O2 supplier [employer]
Follow-up physician's appointment after d/c: she prefers to make her own
follow up appt after discharge
Does patient want to access PORTAL?: no
Discharge plan discussed with patient. She lives at home with her daughter.
She is independent in her ADLs and ambulation. Discussed home health care
services and she denies any home needs at this time. When medically stable she
will be discharged to home.
 
TORRIE JAIME

## 2019-06-05 NOTE — NUR
PHYSICAL THERAPY
Patient evaluated on 5, full evaluation to follow. Continue with PT as per
plan of care with fall, 02, cardiac and acute  debility precautions.  May
require SNF.  PAtient is moderate complexity via chart review, tests and
evaluation: 12393.  Thank you for this referral. Rohini Engel,PT

## 2019-06-06 VITALS — DIASTOLIC BLOOD PRESSURE: 67 MMHG | SYSTOLIC BLOOD PRESSURE: 126 MMHG

## 2019-06-06 VITALS — DIASTOLIC BLOOD PRESSURE: 57 MMHG

## 2019-06-06 VITALS — DIASTOLIC BLOOD PRESSURE: 68 MMHG

## 2019-06-06 VITALS — SYSTOLIC BLOOD PRESSURE: 151 MMHG | DIASTOLIC BLOOD PRESSURE: 60 MMHG

## 2019-06-06 VITALS — SYSTOLIC BLOOD PRESSURE: 124 MMHG | DIASTOLIC BLOOD PRESSURE: 60 MMHG

## 2019-06-06 VITALS — DIASTOLIC BLOOD PRESSURE: 67 MMHG | SYSTOLIC BLOOD PRESSURE: 125 MMHG

## 2019-06-06 VITALS — DIASTOLIC BLOOD PRESSURE: 57 MMHG | SYSTOLIC BLOOD PRESSURE: 105 MMHG

## 2019-06-06 VITALS — DIASTOLIC BLOOD PRESSURE: 67 MMHG

## 2019-06-06 VITALS — DIASTOLIC BLOOD PRESSURE: 61 MMHG

## 2019-06-06 VITALS — DIASTOLIC BLOOD PRESSURE: 50 MMHG | SYSTOLIC BLOOD PRESSURE: 101 MMHG

## 2019-06-06 NOTE — NUR
PHYSICAL THERAPY
Informed consent given by patient. Patient has no new concerns or complaints.
Patient is on 3 liters of spO2 VIA NASAL CANULA. Patient's pulse before
therapy session is 125 and RN informed of patient's pulse rate. RN informed
this PTA that the patient is on a cardizim drip and it is OK to do therapy
with her. Patient transferred supine to sitting at EOB with MIN A X 1. Patient
transferred sit to stand with MIN A X 1. Patient stand pivot transfer to
bedside chair with MIN A X 1. Patient's pulse is 145 post transferring to a
bedside chair. Patient then sit to stand from bedside chair with MIN A X 1.
Patient stood at bedside chair for 1 minute with eyes open and CGA X 1 without
LOB. Patient stood staggered stance 30 seconds with each foot forwards and 1
LOB to the left requiring MIN A X 1 to correct. PATIENT'S PULSE RECORDED AS
165 POST DOING THE STANDING BALANCE ACTIVITIES. Patient does NOT have chair
alarm attached and NURSE was informed of this and she said it was OK for her
NOT to have a chair alarm because of her being a fall risk. Patient was left
in sitting position with call light within reach, and LEs elevated. Patient
was 1:1 with this PTA for 16 minutes total.
                                        ANGELES CONLEY PTA

## 2019-06-06 NOTE — NUR
FAMILY MEMEBER CALLED IN TO INFORM STAFF THAT PATIENT NEEDS TO SIGN PAPERS
FROM [facility], THEY NEED NOTARIZED, AND SHE CAN NOT BE DISCHARGED
UNTIL 3PM IF DISCHARGED ON 6/7/19.

## 2019-06-06 NOTE — NUR
PHYSICAL THERAPY
Informed consent given by patient for afternoon treatment. Patient has no
complaints. Patient is on cardizim drip. Patient is 3 liters of spO2 via nasal
canula. Patient's pulse was recorded as 105 - 110 and O2 SATS recorded as 94%
- 98% prior to therpy session.  Patient transfers sit to stand with MIN A X 1
with verbal cues for pushing off armrests of chair with hands. Patient
ambulated with no assisitive and HHA X 1 for 30' x 1 with spO2 and IV pole in
tow. Patient had no LOB with gait. Patient O2 SATS were recorded as 95% - 97%
and pulse at 110 following ambulation. Patient then performed seated bilateral
LE ther ex 2 x 10 reps each in all planes of movement for strengthening the
LEs in order to improve patient's functional mobility. Patient's pulse back up
to 120 following ther ex in seated position. Patient was left in bedside chair
with call light within reach, spO2 connected to wall outlet, and tray table
near patient. RN says chair aalrm is NOT necessary for this patient becuase
she is not a fall risk. Patient was 1:1 with this PTA for 17 minutes total.
                                            ANGELES CONLEY PTA

## 2019-06-06 NOTE — NUR
Patient requesting a referral [facility]. contacted facility and
faxed referral. Waiting on review. Requires 3 night stay.

## 2019-06-06 NOTE — NUR
PATIENT MEDICATED WITH TYLENOL AS PER PRN ORDER FOR C/O HEADACHE.  RATED PAIN
MILD.  REINFORCED USE OF CALL LIGHT.

## 2019-06-06 NOTE — NUR
in to see patient. Discussed short term rehab and she chose [facility].
Informed [facility] was full. When provided with a list of other facilities she
[facility]. Discharge planner notified.

## 2019-06-07 VITALS — DIASTOLIC BLOOD PRESSURE: 60 MMHG

## 2019-06-07 VITALS — DIASTOLIC BLOOD PRESSURE: 72 MMHG | SYSTOLIC BLOOD PRESSURE: 139 MMHG

## 2019-06-07 VITALS — DIASTOLIC BLOOD PRESSURE: 64 MMHG

## 2019-06-07 VITALS — SYSTOLIC BLOOD PRESSURE: 133 MMHG | DIASTOLIC BLOOD PRESSURE: 77 MMHG

## 2019-06-07 VITALS — DIASTOLIC BLOOD PRESSURE: 62 MMHG

## 2019-06-07 VITALS — SYSTOLIC BLOOD PRESSURE: 93 MMHG | DIASTOLIC BLOOD PRESSURE: 46 MMHG

## 2019-06-07 VITALS — DIASTOLIC BLOOD PRESSURE: 66 MMHG | SYSTOLIC BLOOD PRESSURE: 91 MMHG

## 2019-06-07 VITALS — DIASTOLIC BLOOD PRESSURE: 82 MMHG | SYSTOLIC BLOOD PRESSURE: 134 MMHG

## 2019-06-07 VITALS — SYSTOLIC BLOOD PRESSURE: 127 MMHG | DIASTOLIC BLOOD PRESSURE: 73 MMHG

## 2019-06-07 LAB
APTT PPP: 32.8 SECONDS (ref 20–32.1)
INR BLD: 1 (ref 2–3.5)

## 2019-06-07 NOTE — NUR
2200 medications given at this time as well as Cardizem gtt being titrated to
15ml/hr at this time. Patient tolerated well, call light is within reach.

## 2019-06-07 NOTE — NUR
in to see patient. She is sitting on the edge of her bed without
distress noted. She states she is having a bronchoscopy with Dr. Sheldon today.
Discussed her family calling and about her having to sign court papers today.
Those papers are for oil rights to property that her  sister owns. She
states she will see how she feels after the procedure. When medically stable
she will be discharged [facility]. Discharge planner following.

## 2019-06-07 NOTE — NUR
Patient is resting in bed with easy and regular respers on 3L via nasal
cannula. Assessment is complete with no c/o or s/s of distress noted at this
time. Bed is low, locked, and call light is within reach. See shift
assessment.

## 2019-06-07 NOTE — NUR
PHYSICAL THERAPY
Informed consent given by patient. Patient has no concerns or complaints.
Patient's pulse at 112 at rest before therapy session. Patient is on 3 liters
of spO2 via nasal canula. Patient supine to sitting at EOB transfer with CGA.
Patient O2 sat RECORDED AS 95%. Patient ambulated 36' x 1 with CGA X 1 and 3
liters of spO2 and IV pole in tow by this PTA ,with no LOB or other
difficulty.  Patient standing tolerance E/O for 1 minute with SBA with 1 minor
LOB requiring MIN A X 1 to correct. Patient performed 5 Xs sit to stands in 15
seconds total and ONE minor LOB that the patient corrected herself. Patient
was left in sitting position at EOB with call light within reach, commode in
front of patient ,and curtain pulled for patient privacy. Patient was 1:1 with
this PTA for 18 minutes total.
                                                 ANGELES CONLEY PTA

## 2019-06-07 NOTE — NUR
SURGERY CALLED AND STATED PER DR SWARTZ BRONCHOSCOPY WILL BE PERFORMED MONDAY
R/T SCHEDULE CONFLICT.

## 2019-06-07 NOTE — NUR
[facility], [facility] night stay completed. Patient is
ok to go when medically stable for discharge.

## 2019-06-08 VITALS — DIASTOLIC BLOOD PRESSURE: 48 MMHG | SYSTOLIC BLOOD PRESSURE: 99 MMHG

## 2019-06-08 VITALS — DIASTOLIC BLOOD PRESSURE: 45 MMHG | SYSTOLIC BLOOD PRESSURE: 99 MMHG

## 2019-06-08 VITALS — DIASTOLIC BLOOD PRESSURE: 59 MMHG

## 2019-06-08 VITALS — SYSTOLIC BLOOD PRESSURE: 110 MMHG | DIASTOLIC BLOOD PRESSURE: 52 MMHG

## 2019-06-08 VITALS — DIASTOLIC BLOOD PRESSURE: 60 MMHG | SYSTOLIC BLOOD PRESSURE: 110 MMHG

## 2019-06-08 VITALS — DIASTOLIC BLOOD PRESSURE: 84 MMHG | SYSTOLIC BLOOD PRESSURE: 110 MMHG

## 2019-06-08 VITALS — DIASTOLIC BLOOD PRESSURE: 62 MMHG | SYSTOLIC BLOOD PRESSURE: 125 MMHG

## 2019-06-08 VITALS — DIASTOLIC BLOOD PRESSURE: 63 MMHG

## 2019-06-08 VITALS — DIASTOLIC BLOOD PRESSURE: 54 MMHG

## 2019-06-08 VITALS — DIASTOLIC BLOOD PRESSURE: 50 MMHG

## 2019-06-08 VITALS — DIASTOLIC BLOOD PRESSURE: 49 MMHG

## 2019-06-08 NOTE — NUR
PRN Tylenol seems effective, patient is sleeping with easy and regular
respers on room air. Call light is within reach.

## 2019-06-08 NOTE — NUR
Cardizem #4 initiated at this time running at 15ml/hr, patient is tolerating
well. Call light is within reach.

## 2019-06-08 NOTE — NUR
Patient is resting in bed with easy and regular respers on room air. No s/s of
distress or c/o voiced at this time. Call light is within reach.

## 2019-06-09 VITALS — DIASTOLIC BLOOD PRESSURE: 82 MMHG | SYSTOLIC BLOOD PRESSURE: 116 MMHG

## 2019-06-09 VITALS — SYSTOLIC BLOOD PRESSURE: 106 MMHG | DIASTOLIC BLOOD PRESSURE: 54 MMHG

## 2019-06-09 VITALS — DIASTOLIC BLOOD PRESSURE: 50 MMHG | SYSTOLIC BLOOD PRESSURE: 104 MMHG

## 2019-06-09 VITALS — DIASTOLIC BLOOD PRESSURE: 58 MMHG | SYSTOLIC BLOOD PRESSURE: 106 MMHG

## 2019-06-09 VITALS — SYSTOLIC BLOOD PRESSURE: 110 MMHG | DIASTOLIC BLOOD PRESSURE: 56 MMHG

## 2019-06-09 VITALS — DIASTOLIC BLOOD PRESSURE: 57 MMHG | SYSTOLIC BLOOD PRESSURE: 128 MMHG

## 2019-06-09 VITALS — SYSTOLIC BLOOD PRESSURE: 118 MMHG | DIASTOLIC BLOOD PRESSURE: 44 MMHG

## 2019-06-09 VITALS — DIASTOLIC BLOOD PRESSURE: 60 MMHG | SYSTOLIC BLOOD PRESSURE: 112 MMHG

## 2019-06-09 VITALS — SYSTOLIC BLOOD PRESSURE: 120 MMHG | DIASTOLIC BLOOD PRESSURE: 48 MMHG

## 2019-06-09 VITALS — SYSTOLIC BLOOD PRESSURE: 92 MMHG | DIASTOLIC BLOOD PRESSURE: 40 MMHG

## 2019-06-09 VITALS — DIASTOLIC BLOOD PRESSURE: 72 MMHG

## 2019-06-09 VITALS — DIASTOLIC BLOOD PRESSURE: 63 MMHG

## 2019-06-09 VITALS — DIASTOLIC BLOOD PRESSURE: 60 MMHG

## 2019-06-09 VITALS — SYSTOLIC BLOOD PRESSURE: 131 MMHG | DIASTOLIC BLOOD PRESSURE: 72 MMHG

## 2019-06-09 VITALS — DIASTOLIC BLOOD PRESSURE: 62 MMHG

## 2019-06-09 NOTE — NUR
PATIENT RESTING IN BED. NO SIGNS OR SYMPTOMS OF DISTRESS. RESPIRATIONS EASY
NONLABORED ON ROOM AIR. CARDIZEM INFUSING AT 10MG/HR.

## 2019-06-09 NOTE — NUR
PATIENT RESTING IN BED. NO SIGNS OR SYMPTOMS OF DISTRESS. RESPIRATIONS EASY
NONLABORED ON ROOM AIR. CARDIZEM GTT RUNNING AT 10MG/HR.

## 2019-06-10 VITALS — SYSTOLIC BLOOD PRESSURE: 99 MMHG | DIASTOLIC BLOOD PRESSURE: 30 MMHG

## 2019-06-10 VITALS — DIASTOLIC BLOOD PRESSURE: 89 MMHG

## 2019-06-10 LAB
BUN SERPL-MCNC: 26 MG/DL (ref 7–24)
BURR CELLS BLD QL SMEAR: (no result)
CHLORIDE SERPL-SCNC: 97 MMOL/L (ref 98–107)
CREAT SERPL-MCNC: 1.1 MG/DL (ref 0.55–1.02)
ERYTHROCYTE [DISTWIDTH] IN BLOOD BY AUTOMATED COUNT: 12.5 % (ref 0–14.5)
HCT VFR BLD AUTO: 34.1 % (ref 37–47)
HGB BLD-MCNC: 11.2 G/DL (ref 12–16)
MCH RBC QN AUTO: 32.6 PG (ref 27–31)
MCHC RBC AUTO-ENTMCNC: 32.8 G/DL (ref 33–37)
MCV RBC AUTO: 99.1 FL (ref 81–99)
NRBC BLD QL AUTO: 0 10*3/UL (ref 0–0)
PLATELET # BLD AUTO: 225 10*3/UL (ref 130–400)
PLATELET SUFFICIENCY: NORMAL
PMV BLD AUTO: 11.7 FL (ref 9.6–12.3)
POTASSIUM SERPL-SCNC: 4.4 MMOL/L (ref 3.5–5.1)
RBC # BLD AUTO: 3.44 10*6/UL (ref 4.1–5.1)
SCHISTOCYTES BLD QL SMEAR: (no result)
SODIUM SERPL-SCNC: 135 MMOL/L (ref 136–145)
TOTAL CELLS COUNTED: 100 #CELLS
WBC NRBC COR # BLD AUTO: 8.9 10*3/UL (ref 4.8–10.8)

## 2019-06-10 NOTE — NUR
PT DISCHARGED. LEFT FLOOR VIA STRETCHER IN THE [facility]
SERVICE. REPORT CALLED TO [facility]. PACKET SENT WITH PT.

## 2019-06-10 NOTE — NUR
PHYSICAL THERAPY CO-SIGN
 
 
I approve of the Phyical Therapy notes written above.
 
                                HAROLDO SILVA PT

## 2019-06-10 NOTE — NUR
in to see patient. No new needs or request at this time. When
medically stable she will be discharged to [facility]. Discharge planner
following.

## 2019-06-10 NOTE — NUR
PHYSICAL THERAPY
Patient gives informed consent. Patient has no concerns or complaints.
Patient's O2 SAT was recorded as 96% and pulse at 118 - 132.  Patient
transferred supine to sitting at EOB with MIN A X 1. Patient sit to stand MIN
A X 1 to CGA. Patient is on 2 liters of spO2 via nasal canula. Patient
ambulated with no assistive device and CGA X 1 for 50' x 1 with 2 liters of
spO2 with no LOB. Patient's O2 SAT was 89% post ambulating and pulse was 129.
Patient sat in bedside chair with CGA X 1. Patient sit to stand transfer again
with MIN A X 1 with verbal cues for pushing off armrests of chair with hands.
Patient stood EYES CLOSED for 1 minute with 1 LOB, which required MIN A X 1 to
correct. Patient stood and performed dynamic standing balance reaching across
mid line x 8 each hand with 1 minor LOB which the patient corrected herself.
Patient performed seated bilateral LE THER EX 2 x 10 reps each in all planes
of movement for strengthening the LEs in order to improve patient's functional
mobility. Patient was left in bed side chair with call light within reach, O2
connected to the wall, and LEs elevated. Patient was 1:1 with this PTA for 23
minutes total.
                                           ANGELES CONLEY PTA

## 2019-06-10 NOTE — NUR
patient is discharged to [facility], transportation scheduled for 2 PM with
[employer]. NH, nursing/eriwn clerk and daughter all notified.